# Patient Record
(demographics unavailable — no encounter records)

---

## 2025-04-01 NOTE — ASSESSMENT
[FreeTextEntry1] : 84 y/o F with h/o atrial fibrillation, was on Xarelto presented with left leg pain and numbness, CTA on 9/10/24 showed left CFA occlusion, repeat CTA the next day showed resolution of thrombus in the left CFA. She is now on Eliquis, no leg symptoms currently.  She maintains palpable pulses throughout the lower extremities bilaterally and is without any leg pain currently. Arterial duplex done on today showed normal arterial flow in the left LE without any stenosis.  Advised to continue Eliquis. F/u in 6 months.    I, Dr. Ancelmo Lopez, personally performed the evaluation and management (E/M) services for this established patient who presents today with (a) new problem(s)/exacerbation of (an) existing condition(s). That E/M includes conducting the clinically appropriate interval history &/or exam, assessing all new/exacerbated conditions, and establishing a new plan of care. Today, my TANNER, Amada Arevalo PA-C, was here to observe my evaluation and management service for this new problem/exacerbated condition and follow the plan of care established by me going forward.

## 2025-04-01 NOTE — DATA REVIEWED
[FreeTextEntry1] : Arterial duplex left there is no evidence of hemodynamically significant disease in lower extremity from the groin to the knee.  The common origin deep femoral superficial femoral and popliteal arteries are patent

## 2025-04-01 NOTE — HISTORY OF PRESENT ILLNESS
[FreeTextEntry1] : 84 y/o F with h/o atrial fibrillation, was on Xarelto presented with left leg pain and numbness, CTA showed left CFA occlusion, repeat CTA the next day showed resolution of thrombus in the left CFA. She is now on Eliquis, no leg symptoms currently.  The patient had no surgical intervention